# Patient Record
Sex: FEMALE | Race: WHITE | URBAN - METROPOLITAN AREA
[De-identification: names, ages, dates, MRNs, and addresses within clinical notes are randomized per-mention and may not be internally consistent; named-entity substitution may affect disease eponyms.]

---

## 2017-01-23 ENCOUNTER — ALLIED HEALTH/NURSE VISIT (OUTPATIENT)
Dept: NURSING | Facility: CLINIC | Age: 52
End: 2017-01-23
Payer: MEDICAID

## 2017-01-23 DIAGNOSIS — Z11.1 SCREENING EXAMINATION FOR PULMONARY TUBERCULOSIS: Primary | ICD-10-CM

## 2017-01-23 PROCEDURE — 99207 ZZC NO CHARGE NURSE ONLY: CPT

## 2017-01-23 PROCEDURE — 86580 TB INTRADERMAL TEST: CPT

## 2017-01-23 NOTE — NURSING NOTE
Vinnie is here for mantoux test, required by dermatologist for screening for use of Enbrel.    The patient is asked the following questions today and these are her answers:    -Have you had a mantoux administered in the past 30 days?    No  -Have you had a previous positive Mantoux.  Yes  -Have you received BCG in the past.  No  -Have you had a live vaccine  (MMR, Varicella, OPV, Yellow Fever) in the last 6 weeks.  No  -Have you had and active  viral or bacterial infection in the past 6 weeks.  No  -Have you received corticosteroids or immunosuppressive agents in the past 6 weeks.  Yes, got embrel on Friday, mantoux recommended by dermatology Dr. Fuchs today  -Have you been diagnosed with HIV?  No  -Do you have a maglinancy?  No     Mantoux administered at 2:50 pm by CAMILA VICTORIA attempted 2 times and did not obtain good wheal.  Scheduled for reading on Thursday at 1 pm, no ancillary schedule    Patient would like results faxed to Dr. Fuchs, [facility], phone: [phone], fax:  [phone]  Chrissy Augustin RN  [facility]

## 2017-01-23 NOTE — Clinical Note
[facility]  [address] [place] [address]    January 26, 2017    Vinnie Whatley  [address] [place] [address] [address] [facility] [address]          Dear Vinnie,    Your TB test is negative.    Enclosed is a copy of your results.     Results for orders placed or performed in visit on 01/23/17   TB INTRADERMAL TEST   Result Value Ref Range    PPD Induration 2 mm 0 - 5 mm    PPD Redness 0 mm    Impression    Mantoux results NEGATIVE.  2mm induration.  No swelling.  No redness.  Only bruising noted.    Chrissy Augustin RN  [facility]           If you have any questions or concerns, please call myself or my nurse at [phone].      Sincerely,        Caity Chiu MD/JOHNSON

## 2017-01-23 NOTE — MR AVS SNAPSHOT
"              After Visit Summary   1/23/2017    Vinnie Whatley    MRN: [id number]           Patient Information     Date Of Birth          1965        Visit Information        Provider Department      1/23/2017 2:30 PM CP RN [facility]        Today's Diagnoses     Screening examination for pulmonary tuberculosis    -  1        Follow-ups after your visit        Your next 10 appointments already scheduled     Jan 26, 2017  1:00 PM   Nurse Only with CP RN   [facility]    [address] [facility] [id number] [phone]              Who to contact     If you have questions or need follow up information about today's clinic visit or your schedule please contact [facility] directly at [phone].  Normal or non-critical lab and imaging results will be communicated to you by [employer], letter or phone within 4 business days after the clinic has received the results. If you do not hear from us within 7 days, please contact the clinic through [employer] or phone. If you have a critical or abnormal lab result, we will notify you by phone as soon as possible.  Submit refill requests through [employer] or call your pharmacy and they will forward the refill request to us. Please allow 3 business days for your refill to be completed.          Additional Information About Your Visit        [employer] Information     [employer] lets you send messages to your doctor, view your test results, renew your prescriptions, schedule appointments and more. To sign up, go to [email or web address] . Click on \"Log in\" on the left side of the screen, which will take you to the Welcome page. Then click on \"Sign up Now\" on the right side of the page.     You will be asked to enter the access code listed below, as well as some personal information. Please follow the directions to create your username and password.   "   Your access code is: [email or web address]  Expires: 2017  2:55 PM     Your access code will  in 90 days. If you need help or a new code, please call your [facility] clinic or [phone].        Care EveryWhere ID     This is your Care EveryWhere ID. This could be used by other organizations to access your [facility] medical records  [id number]         Blood Pressure from Last 3 Encounters:   10/12/15 140/77   07/06/15 127/74   05/20/15 132/68    Weight from Last 3 Encounters:   10/12/15 141 lb 8 oz (64.184 kg)   07/06/15 140 lb (63.504 kg)   05/20/15 138 lb 12.8 oz (62.959 kg)              We Performed the Following     TB INTRADERMAL TEST        Primary Care Provider Office Phone # Fax #    Caity Chiu [phone]       [address] [address]        Thank you!     Thank you for choosing [facility]  for your care. Our goal is always to provide you with excellent care. Hearing back from our patients is one way we can continue to improve our services. Please take a few minutes to complete the written survey that you may receive in the mail after your visit with us. Thank you!             Your Updated Medication List - Protect others around you: Learn how to safely use, store and throw away your medicines at www.disposemymeds.org.          This list is accurate as of: 17  2:55 PM.  Always use your most recent med list.                   Brand Name Dispense Instructions for use    ACE/ARB NOT PRESCRIBED (INTENTIONAL)      1 each as needed for other ACE & ARB not prescribed due to Other: blood pressure OK       ASPIRIN NOT PRESCRIBED    INTENTIONAL     continuous prn for other Antiplatelet medication not prescribed intentionally due to Not indicated based on age       atorvastatin 20 MG tablet    LIPITOR    90 tablet    Take 1 tablet (20 mg) by mouth daily       blood glucose lancing device     200 each    Check  blood sugars BID And PRN       blood glucose monitoring lancets     1 Box    Use to test blood sugars 2  times daily or as directed.       blood glucose monitoring test strip    no brand specified    3 Month    Use to test blood sugar BID times daily or as directed.       ENBREL SC          glyBURIDE-metFORMIN 5-500 MG per tablet    GLUCOVANCE    360 tablet    Take 2 tablets by mouth 2 times daily (with meals)       insulin glargine 100 UNIT/ML injection    LANWENDYUS SOLCHITOAR    3 Month    Inject 30 Units Subcutaneous At Bedtime       Insulin Pen Needle 33G X 5 MM Misc     90 each    1 each At Bedtime       triamcinolone 0.1 % ointment    KENALOG    80 g    Apply topically 2 times daily Apply to skin bid as needed

## 2017-01-26 ENCOUNTER — ALLIED HEALTH/NURSE VISIT (OUTPATIENT)
Dept: NURSING | Facility: CLINIC | Age: 52
End: 2017-01-26
Payer: MEDICAID

## 2017-01-26 DIAGNOSIS — Z11.1 SCREENING EXAMINATION FOR PULMONARY TUBERCULOSIS: Primary | ICD-10-CM

## 2017-01-26 LAB
PPDINDURATION: NORMAL MM (ref 0–5)
PPDREDNESS: 0 MM

## 2017-01-26 PROCEDURE — 99207 ZZC NO CHARGE NURSE ONLY: CPT

## 2017-01-26 NOTE — NURSING NOTE
Mantoux result:  PPDREDNESS          0   1/26/2017  PPDINDURATIO     2 mm   1/26/2017        Mantoux results NEGATIVE.  2mm induration.  No swelling.  No redness.  Only bruising noted.    Patient denies cough, fever, night sweats.    Per patient request, printed result faxed to Dr. Fuchs at [facility] at fax: [phone].    Chrissy Augustin RN  [facility]

## 2017-12-03 ENCOUNTER — HEALTH MAINTENANCE LETTER (OUTPATIENT)
Age: 52
End: 2017-12-03

## 2017-12-03 ENCOUNTER — TRANSFERRED RECORDS (OUTPATIENT)
Dept: HEALTH INFORMATION MANAGEMENT | Facility: CLINIC | Age: 52
End: 2017-12-03

## 2019-02-12 ENCOUNTER — TRANSFERRED RECORDS (OUTPATIENT)
Dept: HEALTH INFORMATION MANAGEMENT | Facility: CLINIC | Age: 54
End: 2019-02-12

## 2019-02-12 LAB
HBA1C MFR BLD: 8 % (ref 4–5.6)
LDLC SERPL CALC-MCNC: 88 MG/DL (ref 19–130)

## 2019-03-04 ENCOUNTER — ANCILLARY PROCEDURE (OUTPATIENT)
Dept: GENERAL RADIOLOGY | Facility: CLINIC | Age: 54
End: 2019-03-04
Attending: PHYSICIAN ASSISTANT
Payer: COMMERCIAL

## 2019-03-04 ENCOUNTER — OFFICE VISIT (OUTPATIENT)
Dept: FAMILY MEDICINE | Facility: CLINIC | Age: 54
End: 2019-03-04
Payer: COMMERCIAL

## 2019-03-04 VITALS
DIASTOLIC BLOOD PRESSURE: 84 MMHG | TEMPERATURE: 98.2 F | BODY MASS INDEX: 26.5 KG/M2 | OXYGEN SATURATION: 98 % | HEART RATE: 93 BPM | WEIGHT: 144 LBS | HEIGHT: 62 IN | SYSTOLIC BLOOD PRESSURE: 150 MMHG

## 2019-03-04 DIAGNOSIS — M25.512 LEFT SHOULDER PAIN, UNSPECIFIED CHRONICITY: Primary | ICD-10-CM

## 2019-03-04 DIAGNOSIS — M25.512 LEFT SHOULDER PAIN, UNSPECIFIED CHRONICITY: ICD-10-CM

## 2019-03-04 PROCEDURE — 99213 OFFICE O/P EST LOW 20 MIN: CPT | Performed by: PHYSICIAN ASSISTANT

## 2019-03-04 PROCEDURE — 73030 X-RAY EXAM OF SHOULDER: CPT | Mod: LT

## 2019-03-04 RX ORDER — NAPROXEN 500 MG/1
TABLET ORAL
COMMUNITY

## 2019-03-04 RX ORDER — CITALOPRAM HYDROBROMIDE 20 MG/1
20 TABLET ORAL DAILY
COMMUNITY

## 2019-03-04 RX ORDER — GLIPIZIDE 5 MG/1
TABLET ORAL
COMMUNITY

## 2019-03-04 RX ORDER — LISINOPRIL 10 MG/1
10 TABLET ORAL DAILY
COMMUNITY

## 2019-03-04 ASSESSMENT — MIFFLIN-ST. JEOR: SCORE: 1220.4

## 2019-03-04 NOTE — PROGRESS NOTES
SUBJECTIVE:   Vinnie Whatley is a 53 year old female who presents to clinic today for the following health issues:         L shoulder pain x 3 months     Onset: 3 months     Description:   Location: left shoulder  Character: Sharp and Stabbing and burning     Intensity: 9/10    Progression of Symptoms: worse    Accompanying Signs & Symptoms:  Other symptoms: swelling    History:   Previous similar pain: no       Precipitating factors:   Trauma or overuse: no     Alleviating factors:  Improved by: nothing    Therapies Tried and outcome: pain medication       Sees another provider for her routine check up.  No injury.  Hard to lift arm up.  Naproxen helps a little.  Wakes her up sometimes at night.  No PT yet.  Was suppose to see someone else but it was too far away.            Problem list and histories reviewed & adjusted, as indicated.  Additional history: as documented    Patient Active Problem List   Diagnosis     Psoriasis     Mild major depression (H)     Diabetes mellitus type II, uncontrolled (H)     Hyperlipidemia with target LDL less than 100     Grief reaction     Hypertension goal BP (blood pressure) < 140/90     Type 2 diabetes mellitus with moderate nonproliferative diabetic retinopathy and without macular edema (H)     Cataracts, both eyes     History reviewed. No pertinent surgical history.    Social History     Tobacco Use     Smoking status: Never Smoker     Smokeless tobacco: Never Used   Substance Use Topics     Alcohol use: No     Alcohol/week: 0.0 oz     Family History   Problem Relation Age of Onset     Hypertension Mother      Cancer Other         d. stomach      Diabetes No family hx of      Cerebrovascular Disease No family hx of      Thyroid Disease No family hx of      Glaucoma No family hx of      Macular Degeneration No family hx of            Reviewed and updated as needed this visit by clinical staff  Tobacco  Allergies  Meds  Med Hx  Surg Hx  Fam Hx  Soc Hx      Reviewed and  "updated as needed this visit by Provider         ROS:  As above    OBJECTIVE:     /84   Pulse 93   Temp 98.2  F (36.8  C) (Oral)   Ht 1.581 m (5' 2.25\")   Wt 65.3 kg (144 lb)   SpO2 98%   BMI 26.13 kg/m    Body mass index is 26.13 kg/m .  GENERAL: alert, no distress and obese  RESP: lungs clear to auscultation - no rales, rhonchi or wheezes  CV: regular rates and rhythm, normal S1 S2, no S3 or S4 and no murmur, click or rub  MS: limited rom of left shoulder and neck due to pain, both active and passive rom, tender over left upper neck and along shoulder blade, decreased strength on left     Diagnostic Test Results:  none     ASSESSMENT/PLAN:       1. Left shoulder pain, unspecified chronicity  Likely rotator cuff related.  Start with PT.  If not improving in 4 weeks get MRI or see ortho   - XR Shoulder Left 2 Views; Future  - [facility], AND CHIROPRACTIC REFERRAL; Future    FUTURE APPOINTMENTS:       - Follow-up visit in 4 weeks GEENA Ovalle PA-C  [facility]    "

## 2019-03-05 ENCOUNTER — OFFICE VISIT (OUTPATIENT)
Dept: OPTOMETRY | Facility: CLINIC | Age: 54
End: 2019-03-05
Payer: COMMERCIAL

## 2019-03-05 ENCOUNTER — APPOINTMENT (OUTPATIENT)
Dept: OPTOMETRY | Facility: CLINIC | Age: 54
End: 2019-03-05
Payer: COMMERCIAL

## 2019-03-05 DIAGNOSIS — E11.3313 MODERATE NONPROLIFERATIVE DIABETIC RETINOPATHY OF BOTH EYES WITH MACULAR EDEMA ASSOCIATED WITH TYPE 2 DIABETES MELLITUS (H): ICD-10-CM

## 2019-03-05 DIAGNOSIS — H52.4 PRESBYOPIA: ICD-10-CM

## 2019-03-05 DIAGNOSIS — H52.223 REGULAR ASTIGMATISM OF BOTH EYES: ICD-10-CM

## 2019-03-05 DIAGNOSIS — Z01.01 ENCOUNTER FOR EXAMINATION OF EYES AND VISION WITH ABNORMAL FINDINGS: Primary | ICD-10-CM

## 2019-03-05 DIAGNOSIS — H52.13 MYOPIA OF BOTH EYES: ICD-10-CM

## 2019-03-05 PROCEDURE — 92015 DETERMINE REFRACTIVE STATE: CPT | Performed by: OPTOMETRIST

## 2019-03-05 PROCEDURE — 92004 COMPRE OPH EXAM NEW PT 1/>: CPT | Performed by: OPTOMETRIST

## 2019-03-05 PROCEDURE — V2200 LENS SPHER BIFOC PLANO 4.00D: HCPCS | Mod: LT | Performed by: OPTOMETRIST

## 2019-03-05 PROCEDURE — V2020 VISION SVCS FRAMES PURCHASES: HCPCS | Performed by: OPTOMETRIST

## 2019-03-05 ASSESSMENT — REFRACTION_WEARINGRX
OD_ADD: +1.75
OS_SPHERE: -1.00
OS_AXIS: 170
OS_SPHERE: +0.75
OD_CYLINDER: +0.50
OS_ADD: +1.75
OD_AXIS: 015
OD_SPHERE: -1.00
OS_CYLINDER: +1.00
OD_CYLINDER: +0.50
OS_CYLINDER: +1.00
OD_AXIS: 015
OD_SPHERE: +0.75
OS_AXIS: 170

## 2019-03-05 ASSESSMENT — REFRACTION_MANIFEST
OS_SPHERE: -1.25
OD_ADD: +2.25
OS_ADD: +2.25
OD_AXIS: 013
OS_CYLINDER: +2.00
OD_SPHERE: -1.50
OS_AXIS: 006
OD_CYLINDER: +1.00

## 2019-03-05 ASSESSMENT — CONF VISUAL FIELD
OS_NORMAL: 1
OD_NORMAL: 1

## 2019-03-05 ASSESSMENT — CUP TO DISC RATIO
OD_RATIO: 0.45
OS_RATIO: 0.5

## 2019-03-05 ASSESSMENT — VISUAL ACUITY
OS_SC: 20/80
OS_SC: 20/200
OD_SC: 20/200
METHOD: SNELLEN - LINEAR
OD_SC: 20/80

## 2019-03-05 ASSESSMENT — TONOMETRY
IOP_METHOD: ICARE
OD_IOP_MMHG: 13
OS_IOP_MMHG: 11

## 2019-03-05 ASSESSMENT — SLIT LAMP EXAM - LIDS
COMMENTS: NORMAL
COMMENTS: NORMAL

## 2019-03-05 ASSESSMENT — EXTERNAL EXAM - RIGHT EYE: OD_EXAM: NORMAL

## 2019-03-05 ASSESSMENT — EXTERNAL EXAM - LEFT EYE: OS_EXAM: NORMAL

## 2019-03-05 NOTE — PATIENT INSTRUCTIONS
Diabetic retinopathy with macular thickening each eye. BCVA 20/50 each eye. OCT imaging unavailable.  Refer to [facility] ([facility]) for further evaluation and potential treatment.     Vinnie was advised of today's exam findings.  Fill glasses prescription  Allow 2 weeks to adapt to change in glasses  Copy of glasses Rx provided today.    The affects of the dilating drops last for 4- 6 hours.  You will be more sensitive to light and vision will be blurry up close.  Mydriatic sunglasses were given if needed.      Jamin Yen O.D.  [address] [place]  [address]    [phone]

## 2019-03-05 NOTE — LETTER
3/5/2019         RE: Vinnie Whatley  [address]   [facility] [address]        Dear Colleague,    [facility] you [facility] Please see a copy of my visit note below.    Chief Complaint   Patient presents with     Diabetic Eye Exam     Accompanied by self  Lab Results   Component Value Date    A1C 9.8 10/12/2015    A1C 10.0 05/20/2015    A1C 9.9 03/12/2015    A1C 9.8 11/20/2014    A1C 9.5 09/17/2014       Last Eye Exam: 3 years ago  Dilated Previously: Yes    What are you currently using to see?  Glasses-didn't bring    Distance Vision Acuity: Noticed gradual change in right eye    Near Vision Acuity: Not satisfied    Eye Comfort: good  Do you use eye drops? : No  Occupation or Hobbies: unemployed    Alanna Serrato, [employer]     Medical, surgical and family histories reviewed and updated 3/5/2019.       OBJECTIVE: See Ophthalmology exam    ASSESSMENT:    ICD-10-CM    1. Encounter for examination of eyes and vision with abnormal findings Z01.01    2. Moderate nonproliferative diabetic retinopathy of both eyes with macular edema associated with type 2 diabetes mellitus (H) E11.3313    3. Myopia of both eyes H52.13    4. Regular astigmatism of both eyes H52.223    5. Presbyopia H52.4       PLAN:    Vinnie Whatley aware  eye exam results will be sent to [facility].  Patient Instructions   Diabetic retinopathy with macular thickening each eye. BCVA 20/50 each eye. OCT imaging unavailable.  Refer to [facility] ([facility]) for further evaluation and potential treatment.     Vinnie was advised of today's exam findings.  Fill glasses prescription  Allow 2 weeks to adapt to change in glasses  Copy of glasses Rx provided today.    The affects of the dilating drops last for 4- 6 hours.  You will be more sensitive to light and vision will be blurry up close.  Mydriatic sunglasses were given if needed.      Jamin BAUM  MIROSLAVA Yen.  [address]  [place]  [address]    [phone]             Again, thank you for allowing me to participate in the care of your patient.        Sincerely,        Jamin Yen, OD

## 2019-03-05 NOTE — PROGRESS NOTES
Chief Complaint   Patient presents with     Diabetic Eye Exam     Accompanied by self  Lab Results   Component Value Date    A1C 9.8 10/12/2015    A1C 10.0 05/20/2015    A1C 9.9 03/12/2015    A1C 9.8 11/20/2014    A1C 9.5 09/17/2014       Last Eye Exam: 3 years ago  Dilated Previously: Yes    What are you currently using to see?  Glasses-didn't bring    Distance Vision Acuity: Noticed gradual change in right eye    Near Vision Acuity: Not satisfied    Eye Comfort: good  Do you use eye drops? : No  Occupation or Hobbies: unemployed    Alanna Serrato, [employer]     Medical, surgical and family histories reviewed and updated 3/5/2019.       OBJECTIVE: See Ophthalmology exam    ASSESSMENT:    ICD-10-CM    1. Encounter for examination of eyes and vision with abnormal findings Z01.01    2. Moderate nonproliferative diabetic retinopathy of both eyes with macular edema associated with type 2 diabetes mellitus (H) E11.3313    3. Myopia of both eyes H52.13    4. Regular astigmatism of both eyes H52.223    5. Presbyopia H52.4       PLAN:    Vinnie Whatley aware  eye exam results will be sent to [facility].  Patient Instructions   Diabetic retinopathy with macular thickening each eye. BCVA 20/50 each eye. OCT imaging unavailable.  Refer to [facility] ([facility]) for further evaluation and potential treatment.     Vinnie was advised of today's exam findings.  Fill glasses prescription  Allow 2 weeks to adapt to change in glasses  Copy of glasses Rx provided today.    The affects of the dilating drops last for 4- 6 hours.  You will be more sensitive to light and vision will be blurry up close.  Mydriatic sunglasses were given if needed.      Jamin Yen O.D.  [address]  [address]    [phone]

## 2019-03-11 ENCOUNTER — THERAPY VISIT (OUTPATIENT)
Dept: PHYSICAL THERAPY | Facility: CLINIC | Age: 54
End: 2019-03-11
Payer: COMMERCIAL

## 2019-03-11 DIAGNOSIS — M25.512 ACUTE PAIN OF LEFT SHOULDER: ICD-10-CM

## 2019-03-11 PROCEDURE — 97110 THERAPEUTIC EXERCISES: CPT | Mod: GP | Performed by: PHYSICAL THERAPIST

## 2019-03-11 PROCEDURE — 97161 PT EVAL LOW COMPLEX 20 MIN: CPT | Mod: GP | Performed by: PHYSICAL THERAPIST

## 2019-03-11 PROCEDURE — 97140 MANUAL THERAPY 1/> REGIONS: CPT | Mod: GP | Performed by: PHYSICAL THERAPIST

## 2019-03-11 NOTE — PROGRESS NOTES
[facility] Initial Evaluation  Subjective:  The history is provided by the patient.   Vinnie Whatley is a 53 year old female with a left shoulder (L UB) condition.  Condition occurred with:  Unknown cause.  Condition occurred: for unknown reasons.  This is a new condition  3 months.  Woke up with pain.  MD referral 3/4/2018.  .    Patient reports pain:  Upper trap and scapular area.  Radiates to:  Shoulder and upper arm.  Pain is described as stabbing and aching and is constant and reported as 9/10.  Associated symptoms:  Loss of motion/stiffness and painful arc. Pain is the same all the time.  Symptoms are exacerbated by lying on extremity, using arm behind back and using arm overhead and relieved by rest.  Since onset symptoms are gradually improving.  Special tests:  X-ray (normal ).      General health as reported by patient is poor.  Pertinent medical history includes:  Diabetes.  Medical allergies: no.  Other surgeries include:  None reported.  Current medications:  Pain medication.  Current occupation is none.        Barriers include:  None as reported by the patient.    Red flags:  None as reported by the patient.                        Objective:  Standing Alignment:    Cervical/Thoracic:  Forward head  Shoulder/UE:  Rounded shoulders (guarded on L )                                       Shoulder Evaluation:  ROM:  AROM:  : with substitution.  Flexion:  Left:  90        Abduction:  Left: 75       Internal Rotation:  Left:  Hip                      PROM:  : limited by guarding.  Flexion:  Left:  85          Abduction:  Left:  40                              Strength:  : all limited by pain.                        Special Tests:    Left shoulder positive for the following special tests:  Impingement    Palpation:  Palpation assessed shoulder: hypersensitive - diffuse throughout shoudler girdle.  Left shoulder tenderness present at:  Acrimioclavicular; Supraspinatus; Infraspinatus; Teres  Minor; Subscapularis; Deltoid; Levator; Rhomboids and Upper Trap                                       General     ROS    Assessment/Plan:    Patient is a 53 year old female with left side shoulder complaints.    Patient has the following significant findings with corresponding treatment plan.                Diagnosis 1:  L frozen shoulder  Pain -  US, electric stimulation, manual therapy, self management, education, directional preference exercise and home program  Decreased ROM/flexibility - manual therapy and therapeutic exercise  Decreased joint mobility - manual therapy and therapeutic exercise  Decreased strength - therapeutic exercise and therapeutic activities  Impaired muscle performance - neuro re-education  Decreased function - therapeutic activities    Therapy Evaluation Codes:   1) History comprised of:   Personal factors that impact the plan of care:      None.    Comorbidity factors that impact the plan of care are:      Diabetes.     Medications impacting care: Pain.  2) Examination of Body Systems comprised of:   Body structures and functions that impact the plan of care:      Shoulder.   Activity limitations that impact the plan of care are:      Bathing, Dressing and Lifting.  3) Clinical presentation characteristics are:   Stable/Uncomplicated.  4) Decision-Making    Low complexity using standardized patient assessment instrument and/or measureable assessment of functional outcome.  Cumulative Therapy Evaluation is: Low complexity.    Previous and current functional limitations:  (See Goal Flow Sheet for this information)    Short term and Long term goals: (See Goal Flow Sheet for this information)     Communication ability:  Patient appears to be able to clearly communicate and understand verbal and written communication and follow directions correctly.  Treatment Explanation - The following has been discussed with the patient:   RX ordered/plan of care  Anticipated outcomes  Possible risks and  side effects  This patient would benefit from PT intervention to resume normal activities.   Rehab potential is good.    Frequency:  1 X week, once daily  Duration:  for 6 weeks  Discharge Plan:  Achieve all LTG.  Independent in home treatment program.  Reach maximal therapeutic benefit.    Please refer to the daily flowsheet for treatment today, total treatment time and time spent performing 1:1 timed codes.

## 2019-03-19 ENCOUNTER — THERAPY VISIT (OUTPATIENT)
Dept: PHYSICAL THERAPY | Facility: CLINIC | Age: 54
End: 2019-03-19
Payer: COMMERCIAL

## 2019-03-19 DIAGNOSIS — M25.519 SHOULDER PAIN: Primary | ICD-10-CM

## 2019-03-19 PROCEDURE — 97110 THERAPEUTIC EXERCISES: CPT | Mod: GP | Performed by: PHYSICAL THERAPIST

## 2019-03-19 PROCEDURE — 97112 NEUROMUSCULAR REEDUCATION: CPT | Mod: GP | Performed by: PHYSICAL THERAPIST

## 2019-03-20 PROBLEM — M25.519 SHOULDER PAIN: Status: ACTIVE | Noted: 2019-03-11

## 2019-03-25 ENCOUNTER — THERAPY VISIT (OUTPATIENT)
Dept: PHYSICAL THERAPY | Facility: CLINIC | Age: 54
End: 2019-03-25
Payer: COMMERCIAL

## 2019-03-25 DIAGNOSIS — M25.519 SHOULDER PAIN: ICD-10-CM

## 2019-03-25 PROCEDURE — 97140 MANUAL THERAPY 1/> REGIONS: CPT | Mod: GP | Performed by: PHYSICAL THERAPIST

## 2019-03-25 PROCEDURE — 97110 THERAPEUTIC EXERCISES: CPT | Mod: GP | Performed by: PHYSICAL THERAPIST

## 2019-04-01 ENCOUNTER — THERAPY VISIT (OUTPATIENT)
Dept: PHYSICAL THERAPY | Facility: CLINIC | Age: 54
End: 2019-04-01
Payer: COMMERCIAL

## 2019-04-01 DIAGNOSIS — M25.519 SHOULDER PAIN: ICD-10-CM

## 2019-04-01 PROCEDURE — 97110 THERAPEUTIC EXERCISES: CPT | Mod: GP | Performed by: PHYSICAL THERAPIST

## 2019-04-01 PROCEDURE — 97140 MANUAL THERAPY 1/> REGIONS: CPT | Mod: GP | Performed by: PHYSICAL THERAPIST

## 2019-07-01 PROBLEM — M25.519 SHOULDER PAIN: Status: RESOLVED | Noted: 2019-03-11 | Resolved: 2019-07-01

## 2019-07-01 NOTE — PROGRESS NOTES
Discharge Note    Progress reporting period is from initial evaluation date (please see noted date below) to Apr 1, 2019.  Linked Episodes   Type: Episode: Status: Noted: Resolved: Last update: Updated by:   PHYSICAL THERAPY L shoulder 3/11/2019 Active 3/11/2019  4/1/2019  1:54 PM Kimberly Parker, PT      Comments:       Vinnie failed to follow up and current status is unknown.  Please see information below for last relevant information on current status.  Patient seen for 4 visits.    SUBJECTIVE  Subjective changes noted by patient:  Sometimes feels better, but sometimes feels worse.    .  Current pain level is 5/10.     Previous pain level was  9/10.   Changes in function:  Yes (See Goal flowsheet attached for changes in current functional level)  Adverse reaction to treatment or activity: None    OBJECTIVE  Changes noted in objective findings: AROM flex 108.  Pain limiting ROM      ASSESSMENT/PLAN  Diagnosis: L frozen shoulder   Updated problem list and treatment plan:   Pain - HEP  Decreased ROM/flexibility - HEP  Decreased function - HEP  STG/LTGs have been met or progress has been made towards goals:  Yes, please see goal flowsheet for most current information  Assessment of Progress: current status is unknown.    Last current status:     Self Management Plans:  HEP  I have re-evaluated this patient and find that the nature, scope, duration and intensity of the therapy is appropriate for the medical condition of the patient.  [place] continues to require the following intervention to meet STG and LTG's:  HEP.    Recommendations:  Discharge with current home program.  Patient to follow up with MD as needed.    Please refer to the daily flowsheet for treatment today, total treatment time and time spent performing 1:1 timed codes.

## 2019-08-06 ENCOUNTER — TELEPHONE (OUTPATIENT)
Dept: FAMILY MEDICINE | Facility: CLINIC | Age: 54
End: 2019-08-06

## 2019-08-06 NOTE — LETTER
August 8, 2019    Vinnie Whatley  [address]   [facility] [address]    Dear Vinine    We care about your health and have reviewed your health plan. We have reviewed your medical conditions, medication list, and lab results and are making recommendations based on this review, to better manage your health.    You are in particular need of attention regarding:  - Scheduling a Breast Cancer Screening (Mammography) [phone]  - Scheduling a Colon Cancer Screening (Colonoscopy only) [phone]  - Scheduling a Physical with a Cervical Cancer Screening (Pap Smear) age 64 and younger [phone]      Here is a list of Health Maintenance topics that are due now or due soon:  Health Maintenance Due   Topic Date Due     COLONOSCOPY  06/09/1976     HIV SCREENING  06/09/1981     PREVENTIVE CARE VISIT  09/17/2015     LIPID  11/20/2015     A1C  01/12/2016     DIABETIC FOOT EXAM  03/12/2016     PHQ-9  04/12/2016     ZOSTER IMMUNIZATION (1 of 2) 06/09/2016     TSH W/FREE T4 REFLEX  09/17/2016     MAMMO SCREENING  09/17/2016     BMP  10/12/2016     MICROALBUMIN  10/12/2016     PAP  09/17/2017       Please call us at [phone] (or use castaclip) to address the above recommendations. If we do not hear from you in the next couple of weeks we will be reaching out to you again.    Thank you for trusting [facility] and we appreciate the opportunity to serve you.  We look forward to supporting your healthcare needs in the future.    Healthy Regards,

## 2019-08-07 NOTE — TELEPHONE ENCOUNTER
Panel Management Review      Patient has the following on her problem list:     Depression / Dysthymia review    Measure:  Needs PHQ-9 score of 4 or less during index window.  Administer PHQ-9 and if score is 5 or more, send encounter to provider for next steps.    5 - 7 month window range:     PHQ-9 SCORE 3/12/2015 4/6/2015 10/12/2015   PHQ-9 Total Score 11 11 -   PHQ-9 Total Score - - 5       If PHQ-9 recheck is 5 or more, route to provider for next steps.    Patient is due for:  PHQ9    Diabetes    ASA: Failed    Last A1C  Lab Results   Component Value Date    A1C 9.8 10/12/2015    A1C 10.0 05/20/2015    A1C 9.9 03/12/2015    A1C 9.8 11/20/2014    A1C 9.5 09/17/2014     A1C tested: FAILED    Last LDL:    Lab Results   Component Value Date    CHOL 259 11/20/2014     Lab Results   Component Value Date    HDL 82 11/20/2014     Lab Results   Component Value Date     11/20/2014     Lab Results   Component Value Date    TRIG 106 11/20/2014     Lab Results   Component Value Date    CHOLHDLRATIO 3.2 11/20/2014     No results found for: NHDL    Is the patient on a Statin? YES             Is the patient on Aspirin? NO    Medications     HMG CoA Reductase Inhibitors     atorvastatin (LIPITOR) 20 MG tablet             Last three blood pressure readings:  BP Readings from Last 3 Encounters:   03/04/19 150/84   10/12/15 140/77   07/06/15 127/74       Date of last diabetes office visit:      Tobacco History:     History   Smoking Status     Never Smoker   Smokeless Tobacco     Never Used           IVD   ASA: FAILED    Last LDL:    Lab Results   Component Value Date    CHOL 259 11/20/2014     Lab Results   Component Value Date    HDL 82 11/20/2014     Lab Results   Component Value Date     11/20/2014     Lab Results   Component Value Date    TRIG 106 11/20/2014        Lab Results   Component Value Date    CHOLHDLRATIO 3.2 11/20/2014        Is the patient on a Statin? YES   Is the patient on Aspirin? NO                   Medications     HMG CoA Reductase Inhibitors     atorvastatin (LIPITOR) 20 MG tablet             Last three blood pressure readings:  BP Readings from Last 3 Encounters:   03/04/19 150/84   10/12/15 140/77   07/06/15 127/74        Tobacco History:     History   Smoking Status     Never Smoker   Smokeless Tobacco     Never Used         Composite cancer screening  Chart review shows that this patient is due/due soon for the following Pap Smear, Mammogram and Colonoscopy  Summary:    Patient is due/failing the following:   COLONOSCOPY, MAMMOGRAM, PAP and PHYSICAL    Action needed:   Patient needs office visit for physical .    Type of outreach:    Sent letter.    Questions for provider review:    None                                                                                                                                    Fabricio Jenkins MA       Chart routed to Care Team .

## 2020-01-24 ENCOUNTER — TELEPHONE (OUTPATIENT)
Dept: FAMILY MEDICINE | Facility: CLINIC | Age: 55
End: 2020-01-24

## 2020-01-24 NOTE — TELEPHONE ENCOUNTER
Panel Management Review      Patient has the following on her problem list:     Depression / Dysthymia review    Measure:  Needs PHQ-9 score of 4 or less during index window.  Administer PHQ-9 and if score is 5 or more, send encounter to provider for next steps.    5 - 7 month window range:     PHQ-9 SCORE 3/12/2015 4/6/2015 10/12/2015   PHQ-9 Total Score 11 11 -   PHQ-9 Total Score - - 5       If PHQ-9 recheck is 5 or more, route to provider for next steps.    Patient is due for:  PHQ9    Diabetes    ASA: Passed    Last A1C  Lab Results   Component Value Date    A1C 9.8 10/12/2015    A1C 10.0 05/20/2015    A1C 9.9 03/12/2015    A1C 9.8 11/20/2014    A1C 9.5 09/17/2014     A1C tested: FAILED    Last LDL:    Lab Results   Component Value Date    CHOL 259 11/20/2014     Lab Results   Component Value Date    HDL 82 11/20/2014     Lab Results   Component Value Date     11/20/2014     Lab Results   Component Value Date    TRIG 106 11/20/2014     Lab Results   Component Value Date    CHOLHDLRATIO 3.2 11/20/2014     No results found for: NHDL    Is the patient on a Statin? YES             Is the patient on Aspirin? YES    Medications     HMG CoA Reductase Inhibitors     atorvastatin (LIPITOR) 20 MG tablet       Salicylates     ASPIRIN NOT PRESCRIBED, INTENTIONAL,             Last three blood pressure readings:  BP Readings from Last 3 Encounters:   03/04/19 150/84   10/12/15 140/77   07/06/15 127/74       Date of last diabetes office visit:      Tobacco History:     History   Smoking Status     Never Smoker   Smokeless Tobacco     Never Used         Hypertension   Last three blood pressure readings:  BP Readings from Last 3 Encounters:   03/04/19 150/84   10/12/15 140/77   07/06/15 127/74     Blood pressure: FAILED    HTN Guidelines:  Less than 140/90      Composite cancer screening  Chart review shows that this patient is due/due soon for the following Pap Smear, Mammogram and Colonoscopy  Summary:    Patient is  due/failing the following:   COLONOSCOPY, MAMMOGRAM, PAP and PHYSICAL    Action needed:   Patient needs office visit for physical .    Type of outreach:    Sent letter.    Questions for provider review:    None                                                                                                                                         Chart routed to Care Team .

## 2020-01-24 NOTE — LETTER
January 24, 2020    Vinnie Whatley  [address] [facility] [address]    Dear Vinnie    We care about your health and have reviewed your health plan. We have reviewed your medical conditions, medication list, and lab results and are making recommendations based on this review, to better manage your health.    You are in particular need of attention regarding:  - Scheduling a Breast Cancer Screening (Mammography) [phone]  - Scheduling a Colon Cancer Screening (Colonoscopy only) [phone]  - Scheduling a Physical with a Cervical Cancer Screening (Pap Smear) age 64 and younger [phone]      Here is a list of Health Maintenance topics that are due now or due soon:  Health Maintenance Due   Topic Date Due     COLONOSCOPY  06/09/1976     HIV SCREENING  06/09/1981     PREVENTIVE CARE VISIT  09/17/2015     LIPID  11/20/2015     A1C  01/12/2016     DIABETIC FOOT EXAM  03/12/2016     PHQ-9  04/12/2016     ZOSTER IMMUNIZATION (1 of 2) 06/09/2016     TSH W/FREE T4 REFLEX  09/17/2016     MAMMO SCREENING  09/17/2016     BMP  10/12/2016     MICROALBUMIN  10/12/2016     INFLUENZA VACCINE (1) 09/01/2019     HPV TEST  09/17/2019     PAP  09/17/2019       Please call us at [phone] (or use [employer]) to address the above recommendations. If we do not hear from you in the next couple of weeks we will be reaching out to you again.    Thank you for trusting [facility] and we appreciate the opportunity to serve you.  We look forward to supporting your healthcare needs in the future.    Healthy Regards,    Cristal Ovalle

## 2020-01-24 NOTE — LETTER
February 12, 2020    Vinnie Whatley  [address] [facility] [address]    Dear Vinnie    We care about your health and have reviewed your health plan. We have reviewed your medical conditions, medication list, and lab results and are making recommendations based on this review, to better manage your health.    You are in particular need of attention regarding:  - Scheduling a Breast Cancer Screening (Mammography) [phone]  - Scheduling a Colon Cancer Screening (Colonoscopy only) [phone]  - Scheduling a Physical with a Cervical Cancer Screening (Pap Smear) age 64 and younger [phone]      Here is a list of Health Maintenance topics that are due now or due soon:  Health Maintenance Due   Topic Date Due     COLONOSCOPY  06/09/1976     HIV SCREENING  06/09/1981     PREVENTIVE CARE VISIT  09/17/2015     LIPID  11/20/2015     DIABETIC FOOT EXAM  03/12/2016     PHQ-9  04/12/2016     ZOSTER IMMUNIZATION (1 of 2) 06/09/2016     TSH W/FREE T4 REFLEX  09/17/2016     MAMMO SCREENING  09/17/2016     BMP  10/12/2016     MICROALBUMIN  10/12/2016     A1C  05/12/2019     INFLUENZA VACCINE (1) 09/01/2019     HPV TEST  09/17/2019     PAP  09/17/2019     EYE EXAM  03/05/2020       Please call us at [phone] (or use [employer]) to address the above recommendations. If we do not hear from you in the next couple of weeks we will be reaching out to you again.     Thank you for trusting [facility] and we appreciate the opportunity to serve you.  We look forward to supporting your healthcare needs in the future.    Healthy Regards,      Cristal Ovalle